# Patient Record
Sex: FEMALE | Race: WHITE
[De-identification: names, ages, dates, MRNs, and addresses within clinical notes are randomized per-mention and may not be internally consistent; named-entity substitution may affect disease eponyms.]

---

## 2019-07-20 ENCOUNTER — HOSPITAL ENCOUNTER (EMERGENCY)
Dept: HOSPITAL 47 - EC | Age: 84
Discharge: HOME | End: 2019-07-20
Payer: MEDICARE

## 2019-07-20 VITALS — DIASTOLIC BLOOD PRESSURE: 70 MMHG | HEART RATE: 79 BPM | SYSTOLIC BLOOD PRESSURE: 115 MMHG | TEMPERATURE: 97.8 F

## 2019-07-20 VITALS — RESPIRATION RATE: 16 BRPM

## 2019-07-20 DIAGNOSIS — I10: ICD-10-CM

## 2019-07-20 DIAGNOSIS — Z79.899: ICD-10-CM

## 2019-07-20 DIAGNOSIS — E86.0: Primary | ICD-10-CM

## 2019-07-20 DIAGNOSIS — Z79.82: ICD-10-CM

## 2019-07-20 DIAGNOSIS — I95.1: ICD-10-CM

## 2019-07-20 DIAGNOSIS — E78.5: ICD-10-CM

## 2019-07-20 LAB
ALBUMIN SERPL-MCNC: 3.6 G/DL (ref 3.5–5)
ALP SERPL-CCNC: 58 U/L (ref 38–126)
ALT SERPL-CCNC: 13 U/L (ref 9–52)
ANION GAP SERPL CALC-SCNC: 9 MMOL/L
APTT BLD: 22.9 SEC (ref 22–30)
AST SERPL-CCNC: 22 U/L (ref 14–36)
BASOPHILS # BLD AUTO: 0 K/UL (ref 0–0.2)
BASOPHILS NFR BLD AUTO: 1 %
BUN SERPL-SCNC: 18 MG/DL (ref 7–17)
CALCIUM SPEC-MCNC: 8.5 MG/DL (ref 8.4–10.2)
CHLORIDE SERPL-SCNC: 106 MMOL/L (ref 98–107)
CK SERPL-CCNC: 58 U/L (ref 30–135)
CO2 SERPL-SCNC: 25 MMOL/L (ref 22–30)
EOSINOPHIL # BLD AUTO: 0.3 K/UL (ref 0–0.7)
EOSINOPHIL NFR BLD AUTO: 5 %
ERYTHROCYTE [DISTWIDTH] IN BLOOD BY AUTOMATED COUNT: 3.9 M/UL (ref 3.8–5.4)
ERYTHROCYTE [DISTWIDTH] IN BLOOD: 14.1 % (ref 11.5–15.5)
GLUCOSE SERPL-MCNC: 115 MG/DL (ref 74–99)
HCT VFR BLD AUTO: 36.3 % (ref 34–46)
HGB BLD-MCNC: 12.2 GM/DL (ref 11.4–16)
INR PPP: 0.9 (ref ?–1.2)
LYMPHOCYTES # SPEC AUTO: 1.4 K/UL (ref 1–4.8)
LYMPHOCYTES NFR SPEC AUTO: 24 %
MAGNESIUM SPEC-SCNC: 2.2 MG/DL (ref 1.6–2.3)
MCH RBC QN AUTO: 31.4 PG (ref 25–35)
MCHC RBC AUTO-ENTMCNC: 33.7 G/DL (ref 31–37)
MCV RBC AUTO: 93.2 FL (ref 80–100)
MONOCYTES # BLD AUTO: 0.3 K/UL (ref 0–1)
MONOCYTES NFR BLD AUTO: 5 %
NEUTROPHILS # BLD AUTO: 3.6 K/UL (ref 1.3–7.7)
NEUTROPHILS NFR BLD AUTO: 63 %
PLATELET # BLD AUTO: 217 K/UL (ref 150–450)
POTASSIUM SERPL-SCNC: 4.1 MMOL/L (ref 3.5–5.1)
PROT SERPL-MCNC: 6.4 G/DL (ref 6.3–8.2)
PT BLD: 10.1 SEC (ref 9–12)
SODIUM SERPL-SCNC: 140 MMOL/L (ref 137–145)
WBC # BLD AUTO: 5.7 K/UL (ref 3.8–10.6)

## 2019-07-20 PROCEDURE — 36415 COLL VENOUS BLD VENIPUNCTURE: CPT

## 2019-07-20 PROCEDURE — 85025 COMPLETE CBC W/AUTO DIFF WBC: CPT

## 2019-07-20 PROCEDURE — 80053 COMPREHEN METABOLIC PANEL: CPT

## 2019-07-20 PROCEDURE — 85730 THROMBOPLASTIN TIME PARTIAL: CPT

## 2019-07-20 PROCEDURE — 96360 HYDRATION IV INFUSION INIT: CPT

## 2019-07-20 PROCEDURE — 84484 ASSAY OF TROPONIN QUANT: CPT

## 2019-07-20 PROCEDURE — 71046 X-RAY EXAM CHEST 2 VIEWS: CPT

## 2019-07-20 PROCEDURE — 93005 ELECTROCARDIOGRAM TRACING: CPT

## 2019-07-20 PROCEDURE — 82550 ASSAY OF CK (CPK): CPT

## 2019-07-20 PROCEDURE — 99284 EMERGENCY DEPT VISIT MOD MDM: CPT

## 2019-07-20 PROCEDURE — 96361 HYDRATE IV INFUSION ADD-ON: CPT

## 2019-07-20 PROCEDURE — 85610 PROTHROMBIN TIME: CPT

## 2019-07-20 PROCEDURE — 83735 ASSAY OF MAGNESIUM: CPT

## 2019-07-20 NOTE — XR
EXAMINATION TYPE: XR chest 2V

 

DATE OF EXAM: 7/20/2019

 

COMPARISON: NONE

 

HISTORY: Syncope

 

TECHNIQUE:  Frontal and lateral views of the chest are obtained.

 

FINDINGS:  Heart is normal. Lungs are clear of consolidation. There are calcified mediastinal granulo
dodson. There is no pleural effusion. Bones are osteopenic. There is no heart failure.

 

IMPRESSION:  No active cardiopulmonary disease. Normal heart.